# Patient Record
Sex: FEMALE | Race: ASIAN
[De-identification: names, ages, dates, MRNs, and addresses within clinical notes are randomized per-mention and may not be internally consistent; named-entity substitution may affect disease eponyms.]

---

## 2017-06-22 ENCOUNTER — HOSPITAL ENCOUNTER (OUTPATIENT)
Dept: HOSPITAL 10 - LAB | Age: 37
Discharge: HOME | End: 2017-06-22
Attending: OBSTETRICS & GYNECOLOGY
Payer: COMMERCIAL

## 2017-06-22 DIAGNOSIS — N83.9: Primary | ICD-10-CM

## 2017-06-22 PROCEDURE — 86305 HUMAN EPIDIDYMIS PROTEIN 4: CPT

## 2017-06-22 PROCEDURE — 86304 IMMUNOASSAY TUMOR CA 125: CPT

## 2017-07-18 ENCOUNTER — HOSPITAL ENCOUNTER (OUTPATIENT)
Dept: HOSPITAL 10 - LAB | Age: 37
Discharge: HOME | End: 2017-07-18
Attending: INTERNAL MEDICINE
Payer: COMMERCIAL

## 2017-07-18 DIAGNOSIS — L04.0: Primary | ICD-10-CM

## 2017-07-18 LAB
ADD SCAN DIFF: NO
ALBUMIN SERPL-MCNC: 3.8 G/DL (ref 3.3–4.9)
ALBUMIN/GLOB SERPL: 0.97 {RATIO}
ALP SERPL-CCNC: 58 IU/L (ref 42–121)
ALT SERPL-CCNC: 40 IU/L (ref 13–69)
ANION GAP SERPL CALC-SCNC: 15 MMOL/L (ref 8–16)
AST SERPL-CCNC: 36 IU/L (ref 15–46)
BASOPHILS # BLD AUTO: 0.1 10^3/UL (ref 0–0.1)
BASOPHILS NFR BLD: 1.2 % (ref 0–2)
BILIRUB DIRECT SERPL-MCNC: 0 MG/DL (ref 0–0.2)
BILIRUB SERPL-MCNC: 0 MG/DL (ref 0.2–1.3)
BUN SERPL-MCNC: 11 MG/DL (ref 7–20)
CALCIUM SERPL-MCNC: 8.9 MG/DL (ref 8.4–10.2)
CHLORIDE SERPL-SCNC: 101 MMOL/L (ref 97–110)
CO2 SERPL-SCNC: 30 MMOL/L (ref 21–31)
CREAT SERPL-MCNC: 0.67 MG/DL (ref 0.44–1)
CRP SERPL-MCNC: < 0.5 MG/DL (ref 0–0.9)
EOSINOPHIL # BLD: 0.1 10^3/UL (ref 0–0.5)
EOSINOPHIL NFR BLD: 2.7 % (ref 0–7)
ERYTHROCYTE [DISTWIDTH] IN BLOOD BY AUTOMATED COUNT: 15.5 % (ref 11.5–14.5)
GLOBULIN SER-MCNC: 3.9 G/DL (ref 1.3–3.2)
GLUCOSE SERPL-MCNC: 94 MG/DL (ref 70–220)
HCT VFR BLD CALC: 37.1 % (ref 37–47)
HGB BLD-MCNC: 12.2 G/DL (ref 12–16)
LYMPHOCYTES # BLD AUTO: 1.1 10^3/UL (ref 0.8–2.9)
LYMPHOCYTES NFR BLD AUTO: 21 % (ref 15–51)
MCH RBC QN AUTO: 26.9 PG (ref 29–33)
MCHC RBC AUTO-ENTMCNC: 32.9 G/DL (ref 32–37)
MCV RBC AUTO: 81.9 FL (ref 82–101)
MONOCYTES # BLD: 0.5 10^3/UL (ref 0.3–0.9)
MONOCYTES NFR BLD: 9.1 % (ref 0–11)
NEUTROPHILS # BLD: 3.4 10^3/UL (ref 1.6–7.5)
NEUTROPHILS NFR BLD AUTO: 65.8 % (ref 39–77)
NRBC # BLD MANUAL: 0 10^3/UL (ref 0–0)
NRBC BLD QL: 0 /100WBC (ref 0–0)
PLATELET # BLD: 280 10^3/UL (ref 140–415)
PMV BLD AUTO: 9.6 FL (ref 7.4–10.4)
POTASSIUM SERPL-SCNC: 4.1 MMOL/L (ref 3.5–5.1)
PROT SERPL-MCNC: 7.7 G/DL (ref 6.1–8.1)
RBC # BLD AUTO: 4.53 10^6/UL (ref 4.2–5.4)
SODIUM SERPL-SCNC: 142 MMOL/L (ref 135–144)
WBC # BLD AUTO: 5.2 10^3/UL (ref 4.8–10.8)

## 2017-07-18 PROCEDURE — 85651 RBC SED RATE NONAUTOMATED: CPT

## 2017-07-18 PROCEDURE — 86140 C-REACTIVE PROTEIN: CPT

## 2017-07-18 PROCEDURE — 85025 COMPLETE CBC W/AUTO DIFF WBC: CPT

## 2017-07-18 PROCEDURE — 86038 ANTINUCLEAR ANTIBODIES: CPT

## 2017-07-18 PROCEDURE — 80053 COMPREHEN METABOLIC PANEL: CPT

## 2017-07-27 ENCOUNTER — HOSPITAL ENCOUNTER (OUTPATIENT)
Dept: HOSPITAL 10 - C/S | Age: 37
Discharge: HOME | End: 2017-07-27
Attending: INTERNAL MEDICINE
Payer: COMMERCIAL

## 2017-07-27 ENCOUNTER — HOSPITAL ENCOUNTER (OUTPATIENT)
Dept: HOSPITAL 10 - C/S | Age: 37
Discharge: HOME | End: 2017-07-27
Attending: OBSTETRICS & GYNECOLOGY
Payer: COMMERCIAL

## 2017-07-27 DIAGNOSIS — N94.89: Primary | ICD-10-CM

## 2017-07-27 DIAGNOSIS — R59.0: Primary | ICD-10-CM

## 2017-07-27 PROCEDURE — 70491 CT SOFT TISSUE NECK W/DYE: CPT

## 2017-07-27 PROCEDURE — 72192 CT PELVIS W/O DYE: CPT

## 2017-07-27 PROCEDURE — 86308 HETEROPHILE ANTIBODY SCREEN: CPT

## 2017-07-27 PROCEDURE — 84703 CHORIONIC GONADOTROPIN ASSAY: CPT

## 2017-07-27 PROCEDURE — 86644 CMV ANTIBODY: CPT

## 2017-07-27 PROCEDURE — 86664 EPSTEIN-BARR NUCLEAR ANTIGEN: CPT

## 2017-07-28 LAB
CMV IGG SERPL IA-ACNC: 1.7 U/ML
CMV IGM SERPL IA-ACNC: <30 AU/ML
EBV ANTIBODY - INTERPRETATION: (no result)

## 2017-07-28 NOTE — RADRPT
PROCEDURE:   CT Pelvis without contrast. 

 

CLINICAL INDICATION:   Right pelvic mass. 

 

TECHNIQUE:   CT scan of the pelvis without intravenous contrast was performed. Helical axial section
s were obtained through the pelvis without intravenous contrast enhancement.  Coronal and sagittal r
eformatted images were obtained from the axial source images. Images were reviewed on a high-resolut
ion PACS workstation. Total exam DLP is 142.07 mGy-cm.  CTDIvol is 4.69 mGy.  One or more of the fol
lowing dose reduction techniques were used: Automated exposure control, adjustment of the mA and/or 
kV according to patient size, use of iterative reconstruction technique.

 

COMPARISON:   None. 

 

FINDINGS:

There is a predominately fat-containing mass in the right side of the pelvis measuring 3.6 x 2.5 x 3
.9 cm in AP, transverse, and cranial caudal dimensions.  The mass has a more solid component inferio
rly.  The findings are consistent with a benign ovarian teratoma. There is no other pelvic mass or l
ymphadenopathy.

Contrast is present in the ureters and bladder from a prior intravenous contrast enhanced CT scan of
 the soft tissues of the neck. 

The periappendiceal region is unremarkable with no evidence of appendicitis. 

The bowel and mesentery are normal. 

There is no free fluid or free gas. 

The osseous structures are unremarkable with no fracture or lytic lesion.  

 

IMPRESSION:

1.  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

2.  Otherwise unremarkable study.  

 

RPTAT: QQ

_____________________________________________ 

.Jp Mathias MD, MD           Date    Time 

Electronically viewed and signed by .Jp Mathias MD, MD on 07/28/2017 14:01 

 

D:  07/28/2017 14:01  T:  07/28/2017 14:01

.R/

## 2017-07-28 NOTE — RADRPT
PROCEDURE: CT Neck with contrast

 

CLINICAL INDICATION: Cervical lymphadenopathy 

 

TECHNIQUE:   CT of the neck was performed following the intravenous administration of 80 cc Omnipaqu
e-300 IV Contrast. Axial images were obtained through the neck with multiplanar reformatted images g
enerated from the axial acquired data. The administered radiation dose was CTDI vol =  8.33 mGy, DLP
 = 222.35 mGy-cm. One or more of the following dose reduction techniques were used: Automated exposu
re control, Adjustment of the mA and/or kV according to patient size, or Use of iterative reconstruc
tion technique.

 

COMPARISON: There are no similar studies submitted for comparison.

 

FINDINGS:

 

SKULL: The visualized portions of the brain are grossly unremarkable.The visualized orbits are unrem
arkable.The visualized paranasal sinuses are well aerated.The bilateral mastoid air cells are within
 normal limits. 

 

PAROTID GLANDS: The bilateral parotid glands are prominent and contain multiple microcysts and sever
al punctate calcifications/sialoliths.  Along the proximal left parotid duct, there is an ovoid low 
attenuation soft tissue measuring 11 mm AP x 6 mm TR x 14 mm CC. While this may represent accessory 
parotid tissue, it appears and measures lower in attenuation than the adjacent main left parotid gla
nd.

 

SUBMANDIBULAR GLANDS: Unremarkable. 

 

THYROID GLAND: Unremarkable. 

 

VASCULATURE: The bilateral vascular structures are patent.

 

LYMPH NODES: Multiple small lymph nodes are identified in the neck in levels I-V which are not patho
logically enlarged or necrotic. The lymph nodes are relatively bilateral and symmetric in distributi
on.

 

AERODIGESTIVE TRACT: There is streak artifact from dental hardware limiting evaluation of the oral c
avity.No primary aerodigestive tract lesion is identified.

 

THORAX: The partially imaged upper lung reveal multiple bilateral nodular and consolidative opacitie
s in a peribronchovascular distribution, suspicious for infectious/inflammatory etiology.  

 

OSSEOUS STRUCTURES: No destructive lytic or blastic osseous lesion is identified.

 

IMPRESSION:

 

1.  Bilateral prominent parotid gland containing microcysts and several punctate intraparotid calcif
ications/sialoliths bilaterally. No stones are seen within the parotid ducts. No significant adjacen
t inflammation to suggest sialoadenitis. The findings c are nonspecific, but can be seen sjogren's d
isease.

 

2. Ovoid soft tissue measuring up to 14 mm adjacent to the left retromandibular vein and proximal le
ft parotid duct. While this may represent accessory parotid tissue, it measures lower attenuation th
an the adjacent main left parotid gland. Recommend MRI neck with contrast for further evaluation.

 

3. The partially imaged upper lung reveal multiple bilateral nodular and consolidative opacities in 
a peribronchovascular distribution, suspicious for infectious/inflammatory etiology. Consider CT uriel
st for further evaluation, as clinically indicated.

 

4.  No cervical adenopathy.

 

RPTAT: AA

_____________________________________________ 

Geri Ma Physician           Date    Time 

Electronically viewed and signed by Geri Ma Physician on 07/28/2017 14:57 

 

D:  07/28/2017 14:57  T:  07/28/2017 14:57

KARLOS/

## 2017-08-03 ENCOUNTER — HOSPITAL ENCOUNTER (OUTPATIENT)
Dept: HOSPITAL 10 - C/S | Age: 37
Discharge: HOME | End: 2017-08-03
Attending: INTERNAL MEDICINE
Payer: COMMERCIAL

## 2017-08-03 DIAGNOSIS — R93.8: Primary | ICD-10-CM

## 2017-08-03 DIAGNOSIS — R91.8: ICD-10-CM

## 2017-08-03 PROCEDURE — 71260 CT THORAX DX C+: CPT

## 2017-08-03 NOTE — RADRPT
PROCEDURE:   CT Chest with contrast. 

 

CLINICAL INDICATION:   Follow-up abnormal CT scan of the soft tissues of the neck.  Upper lung bilat
eral nodular opacities.. 

 

TECHNIQUE:   Helical axial sections were obtained through the chest with intravenous contrast enhanc
ement. 75  ml of Omnipaque-300  was used for the intravenous contrast.  Coronal and sagittal reforma
tted images were obtained from the axial source images. Total exam DLP is 122.25 mGy-cm.  CTDIvol is
 3.47 mGy.  One or more of the following dose reduction techniques were used: Automated exposure con
trol, adjustment of the mA and/or kV according to patient size, use of iterative reconstruction tech
nique.

 

COMPARISON:    Chest x-ray dated 10/18/2016.  CT scan of the soft tissues of the neck dated 07/27/20
17.    

 

FINDINGS:

As seen on the prior CT scan, there are bilateral nodular and consolidation opacities in the apices 
consistent with an inflammatory or infectious process.  The lungs are otherwise clear with no other 
airspace or interstitial disease. 

There is no pulmonary nodule or mass lesion. 

There is no mediastinal or hilar lymphadenopathy or mass. 

There is no axillary, supraclavicular, or internal mammary lymphadenopathy. 

The thoracic aorta is not dilated. 

The heart size is normal. 

There is no pleural effusion or pericardial effusion. 

Images through the upper abdomen demonstrate normal visualized portions of the liver, spleen, and ad
renals.  

 

IMPRESSION:

1.  Bilateral nodular and consolidation opacities in the lung apices consistent with an inflammatory
 or infectious process.  Clinical correlation is advised.  This was also present on 10/18/2016 chest
 x-ray.

2.  Otherwise normal CT scan of the chest.  

 

RPTAT: QQ

_____________________________________________ 

.Jp Mathias MD, MD           Date    Time 

Electronically viewed and signed by .Jp Mathias MD, MD on 08/03/2017 17:20 

 

D:  08/03/2017 17:20  T:  08/03/2017 17:20

.R/

## 2017-08-11 ENCOUNTER — HOSPITAL ENCOUNTER (INPATIENT)
Dept: HOSPITAL 10 - REC | Age: 37
LOS: 2 days | Discharge: HOME | DRG: 743 | End: 2017-08-13
Attending: OBSTETRICS & GYNECOLOGY | Admitting: OBSTETRICS & GYNECOLOGY
Payer: COMMERCIAL

## 2017-08-11 VITALS — SYSTOLIC BLOOD PRESSURE: 120 MMHG | DIASTOLIC BLOOD PRESSURE: 80 MMHG | RESPIRATION RATE: 22 BRPM | HEART RATE: 86 BPM

## 2017-08-11 VITALS — RESPIRATION RATE: 16 BRPM | HEART RATE: 74 BPM | SYSTOLIC BLOOD PRESSURE: 107 MMHG | DIASTOLIC BLOOD PRESSURE: 66 MMHG

## 2017-08-11 VITALS — HEART RATE: 76 BPM | DIASTOLIC BLOOD PRESSURE: 78 MMHG | RESPIRATION RATE: 15 BRPM | SYSTOLIC BLOOD PRESSURE: 108 MMHG

## 2017-08-11 VITALS — DIASTOLIC BLOOD PRESSURE: 64 MMHG | RESPIRATION RATE: 14 BRPM | SYSTOLIC BLOOD PRESSURE: 109 MMHG | HEART RATE: 76 BPM

## 2017-08-11 VITALS — DIASTOLIC BLOOD PRESSURE: 85 MMHG | SYSTOLIC BLOOD PRESSURE: 131 MMHG | HEART RATE: 80 BPM | RESPIRATION RATE: 20 BRPM

## 2017-08-11 VITALS — DIASTOLIC BLOOD PRESSURE: 72 MMHG | RESPIRATION RATE: 20 BRPM | SYSTOLIC BLOOD PRESSURE: 127 MMHG | HEART RATE: 78 BPM

## 2017-08-11 VITALS — SYSTOLIC BLOOD PRESSURE: 110 MMHG | DIASTOLIC BLOOD PRESSURE: 70 MMHG | HEART RATE: 74 BPM | RESPIRATION RATE: 17 BRPM

## 2017-08-11 VITALS — SYSTOLIC BLOOD PRESSURE: 106 MMHG | DIASTOLIC BLOOD PRESSURE: 63 MMHG | HEART RATE: 75 BPM | RESPIRATION RATE: 16 BRPM

## 2017-08-11 VITALS — DIASTOLIC BLOOD PRESSURE: 68 MMHG | SYSTOLIC BLOOD PRESSURE: 103 MMHG | RESPIRATION RATE: 18 BRPM | HEART RATE: 98 BPM

## 2017-08-11 VITALS
BODY MASS INDEX: 20.44 KG/M2 | WEIGHT: 101.41 LBS | BODY MASS INDEX: 20.44 KG/M2 | WEIGHT: 101.41 LBS | HEIGHT: 59 IN | HEIGHT: 59 IN

## 2017-08-11 VITALS — HEART RATE: 85 BPM | SYSTOLIC BLOOD PRESSURE: 106 MMHG | DIASTOLIC BLOOD PRESSURE: 61 MMHG | RESPIRATION RATE: 16 BRPM

## 2017-08-11 VITALS — SYSTOLIC BLOOD PRESSURE: 119 MMHG | HEART RATE: 75 BPM | RESPIRATION RATE: 24 BRPM | DIASTOLIC BLOOD PRESSURE: 75 MMHG

## 2017-08-11 VITALS — SYSTOLIC BLOOD PRESSURE: 102 MMHG | DIASTOLIC BLOOD PRESSURE: 57 MMHG | RESPIRATION RATE: 19 BRPM

## 2017-08-11 VITALS — RESPIRATION RATE: 19 BRPM | DIASTOLIC BLOOD PRESSURE: 69 MMHG | SYSTOLIC BLOOD PRESSURE: 112 MMHG

## 2017-08-11 VITALS — RESPIRATION RATE: 19 BRPM | HEART RATE: 74 BPM | DIASTOLIC BLOOD PRESSURE: 65 MMHG | SYSTOLIC BLOOD PRESSURE: 111 MMHG

## 2017-08-11 VITALS — HEART RATE: 72 BPM | RESPIRATION RATE: 14 BRPM | SYSTOLIC BLOOD PRESSURE: 113 MMHG | DIASTOLIC BLOOD PRESSURE: 75 MMHG

## 2017-08-11 VITALS — SYSTOLIC BLOOD PRESSURE: 107 MMHG | DIASTOLIC BLOOD PRESSURE: 66 MMHG | RESPIRATION RATE: 16 BRPM | HEART RATE: 70 BPM

## 2017-08-11 VITALS — DIASTOLIC BLOOD PRESSURE: 75 MMHG | RESPIRATION RATE: 17 BRPM | SYSTOLIC BLOOD PRESSURE: 115 MMHG | HEART RATE: 74 BPM

## 2017-08-11 VITALS — HEART RATE: 96 BPM | RESPIRATION RATE: 16 BRPM | DIASTOLIC BLOOD PRESSURE: 60 MMHG | SYSTOLIC BLOOD PRESSURE: 98 MMHG

## 2017-08-11 VITALS — SYSTOLIC BLOOD PRESSURE: 106 MMHG | RESPIRATION RATE: 14 BRPM | DIASTOLIC BLOOD PRESSURE: 62 MMHG | HEART RATE: 86 BPM

## 2017-08-11 DIAGNOSIS — Z83.3: ICD-10-CM

## 2017-08-11 DIAGNOSIS — Z84.89: ICD-10-CM

## 2017-08-11 DIAGNOSIS — D27.0: Primary | ICD-10-CM

## 2017-08-11 DIAGNOSIS — Z80.9: ICD-10-CM

## 2017-08-11 LAB
APTT BLD: 29.3 SEC (ref 25–35)
BASOPHILS # BLD AUTO: 0.1 10^3/UL (ref 0–0.1)
BASOPHILS NFR BLD: 1.1 % (ref 0–2)
EOSINOPHIL # BLD: 0.2 10^3/UL (ref 0–0.5)
EOSINOPHIL NFR BLD: 3.9 % (ref 0–7)
ERYTHROCYTE [DISTWIDTH] IN BLOOD BY AUTOMATED COUNT: 15.5 % (ref 11.5–14.5)
HCT VFR BLD CALC: 33.7 % (ref 37–47)
HGB BLD-MCNC: 11.2 G/DL (ref 12–16)
INR PPP: 0.92
LYMPHOCYTES # BLD AUTO: 1.5 10^3/UL (ref 0.8–2.9)
LYMPHOCYTES NFR BLD AUTO: 34.7 % (ref 15–51)
MCH RBC QN AUTO: 26.7 PG (ref 29–33)
MCHC RBC AUTO-ENTMCNC: 33.2 G/DL (ref 32–37)
MCV RBC AUTO: 80.2 FL (ref 82–101)
MONOCYTES # BLD: 0.4 10^3/UL (ref 0.3–0.9)
MONOCYTES NFR BLD: 9 % (ref 0–11)
NEUTROPHILS # BLD: 2.2 10^3/UL (ref 1.6–7.5)
NEUTROPHILS NFR BLD AUTO: 51.1 % (ref 39–77)
NRBC # BLD MANUAL: 0 10^3/UL (ref 0–0)
NRBC BLD AUTO-RTO: 0 /100WBC (ref 0–0)
PLATELET # BLD: 269 10^3/UL (ref 140–415)
PMV BLD AUTO: 9.3 FL (ref 7.4–10.4)
PROTHROMBIN TIME: 12.4 SEC (ref 12.2–14.2)
PT RATIO: 1
RBC # BLD AUTO: 4.2 10^6/UL (ref 4.2–5.4)
WBC # BLD AUTO: 4.4 10^3/UL (ref 4.8–10.8)

## 2017-08-11 PROCEDURE — 85025 COMPLETE CBC W/AUTO DIFF WBC: CPT

## 2017-08-11 PROCEDURE — 84703 CHORIONIC GONADOTROPIN ASSAY: CPT

## 2017-08-11 PROCEDURE — 85730 THROMBOPLASTIN TIME PARTIAL: CPT

## 2017-08-11 PROCEDURE — 0UB00ZZ EXCISION OF RIGHT OVARY, OPEN APPROACH: ICD-10-PCS | Performed by: OBSTETRICS & GYNECOLOGY

## 2017-08-11 PROCEDURE — 85610 PROTHROMBIN TIME: CPT

## 2017-08-11 PROCEDURE — 87086 URINE CULTURE/COLONY COUNT: CPT

## 2017-08-11 RX ADMIN — PYRIDOXINE HYDROCHLORIDE SCH MLS/HR: 100 INJECTION, SOLUTION INTRAMUSCULAR; INTRAVENOUS at 23:03

## 2017-08-11 RX ADMIN — DEXAMETHASONE SODIUM PHOSPHATE PRN MG: 10 INJECTION, SOLUTION INTRAMUSCULAR; INTRAVENOUS at 22:59

## 2017-08-11 NOTE — OPR
Date/Time of Note


Date/Time of Note


DATE: 8/11/17 


TIME: 17:12





Operative Report


Procedure Date:  Aug 11, 2017


Preoperative Diagnosis


Right 4 cm ovarian dermoid cyst


Postoperative Diagnosis


Same.


Operation Performed


Minilaparotomy with right ovarian cystectomy.


Surgeon:  LUNA EAGLE MD


Anesthesia Type:  general


Anesthesiologist:  BABITA REED MD


Estimated Blood Loss:  0 - 10 ml's


Transfusion Required:  no


Specimens


Right ovarian dermoid cyst.


Complications:  no


Pt Condition Post Procedure:  stable


Disposition:  PACU


Procedure Description


Pt was brought to the OR and placed under general anesthesia. A stewart catheter 

was placed and she was prepped and draped in the usual sterile fashion. A small 

Pfannensteil incision was made using a knife through the skin and subcutaneous 

tissue. The fascia was incised and opened further with scissors. Kocher clamps 

were used to grasp the upper edge of the fascia and the rectus muscles were 

 from the fascia using blunt dissection. The rectus muscles were 

 at midline with a lenny clamp and the anterior peritoneum was entered 

using that and fingers. The incision was stretched open using hands. The right 

ovary was located and elevated through the incision with a 4 cm cyst apparent. 

The left one was located and appeared normal. Wet laps were placed around the 

right ovary and a knife was used to incise the surface. Blunt dissection and 

scissors were used to peel out the cyst from the ovary. There was a little bit 

of leakage of fatty fluid and hairs could be seen peaking out of the leak site. 

When the cyst was removed, the ovary was then stitched back together using 2-0 

chromic in a purse string stitch for the inside and a baseball stitch on the 

outside with excellent hemostasis at the end. A piece of Interceed was sewn to 

the surface. The pelvis was irrigated lightly and the ovary was replaced back 

into the abdomen. The anterior peritoneum was closed with 2-0 vicryl and the 

muscles brought back together at midline. The underbelly of the fascia was 

examined and noted to be dry. The fascia was closed with 0 Chromic suture.The 

subcutaneous tissue was irrigated and closed with 2-0 chromic. The skin wqas 

closed with 4-0 monocryl. Steri-strips were placed at the end with Benzoin. The 

procedure was terminated with the pt having tolerated it well and she was 

awakened from general anesthesia and transferred to the recovery room.











LUNA EAGLE MD Aug 11, 2017 17:29

## 2017-08-12 VITALS — SYSTOLIC BLOOD PRESSURE: 119 MMHG | RESPIRATION RATE: 15 BRPM | DIASTOLIC BLOOD PRESSURE: 76 MMHG

## 2017-08-12 VITALS — DIASTOLIC BLOOD PRESSURE: 57 MMHG | SYSTOLIC BLOOD PRESSURE: 110 MMHG | RESPIRATION RATE: 18 BRPM

## 2017-08-12 VITALS — RESPIRATION RATE: 18 BRPM | SYSTOLIC BLOOD PRESSURE: 106 MMHG | DIASTOLIC BLOOD PRESSURE: 61 MMHG

## 2017-08-12 VITALS — DIASTOLIC BLOOD PRESSURE: 63 MMHG | SYSTOLIC BLOOD PRESSURE: 101 MMHG | RESPIRATION RATE: 16 BRPM

## 2017-08-12 RX ADMIN — HYDROMORPHONE HYDROCHLORIDE PRN MG: 1 INJECTION, SOLUTION INTRAMUSCULAR; INTRAVENOUS; SUBCUTANEOUS at 17:27

## 2017-08-12 RX ADMIN — DEXAMETHASONE SODIUM PHOSPHATE PRN MG: 10 INJECTION, SOLUTION INTRAMUSCULAR; INTRAVENOUS at 11:37

## 2017-08-12 RX ADMIN — PYRIDOXINE HYDROCHLORIDE SCH MLS/HR: 100 INJECTION, SOLUTION INTRAMUSCULAR; INTRAVENOUS at 07:53

## 2017-08-12 RX ADMIN — PYRIDOXINE HYDROCHLORIDE SCH MLS/HR: 100 INJECTION, SOLUTION INTRAMUSCULAR; INTRAVENOUS at 18:09

## 2017-08-12 RX ADMIN — HYDROMORPHONE HYDROCHLORIDE PRN MG: 1 INJECTION, SOLUTION INTRAMUSCULAR; INTRAVENOUS; SUBCUTANEOUS at 21:47

## 2017-08-12 RX ADMIN — DEXAMETHASONE SODIUM PHOSPHATE PRN MG: 10 INJECTION, SOLUTION INTRAMUSCULAR; INTRAVENOUS at 18:09

## 2017-08-12 RX ADMIN — PYRIDOXINE HYDROCHLORIDE SCH MLS/HR: 100 INJECTION, SOLUTION INTRAMUSCULAR; INTRAVENOUS at 07:50

## 2017-08-12 RX ADMIN — HYDROMORPHONE HYDROCHLORIDE PRN MG: 1 INJECTION, SOLUTION INTRAMUSCULAR; INTRAVENOUS; SUBCUTANEOUS at 13:05

## 2017-08-12 RX ADMIN — IBUPROFEN SCH MG: 800 TABLET ORAL at 19:40

## 2017-08-12 RX ADMIN — HYDROMORPHONE HYDROCHLORIDE PRN MG: 1 INJECTION, SOLUTION INTRAMUSCULAR; INTRAVENOUS; SUBCUTANEOUS at 00:37

## 2017-08-12 RX ADMIN — HYDROMORPHONE HYDROCHLORIDE PRN MG: 1 INJECTION, SOLUTION INTRAMUSCULAR; INTRAVENOUS; SUBCUTANEOUS at 07:49

## 2017-08-12 NOTE — QN
Documentation


Comment


Progress note POD #1


Pt feeling nauseous this AM. Had received 2 mg of Dilaudid which was too much 

for her and her BP dropped a little. 1 mg gave her adequate pain relief but 

still has a bit of nausea.


No flatus.


T=98.7  /76


Abdomen soft, NT. Dressing is clean, dry and intact.


Ext NT with A-pumps.


A: POD #1. Stable.


P: Continue care. Tried 0.5 Dilaudid and pt is not getting adequate relief. 

Does not want Toradol again as felt her chest tighten afterwards. Now tells me 

that she took Motrin 800mg only after her c-sections.Will initiate that. Brady 

must be d/c'ed in the AM.











LUNA EAGLE MD Aug 12, 2017 19:32

## 2017-08-13 VITALS — RESPIRATION RATE: 20 BRPM | SYSTOLIC BLOOD PRESSURE: 115 MMHG | DIASTOLIC BLOOD PRESSURE: 75 MMHG

## 2017-08-13 VITALS — SYSTOLIC BLOOD PRESSURE: 116 MMHG | DIASTOLIC BLOOD PRESSURE: 61 MMHG | RESPIRATION RATE: 18 BRPM

## 2017-08-13 RX ADMIN — DEXAMETHASONE SODIUM PHOSPHATE PRN MG: 10 INJECTION, SOLUTION INTRAMUSCULAR; INTRAVENOUS at 09:20

## 2017-08-13 RX ADMIN — HYDROMORPHONE HYDROCHLORIDE PRN MG: 1 INJECTION, SOLUTION INTRAMUSCULAR; INTRAVENOUS; SUBCUTANEOUS at 03:21

## 2017-08-13 RX ADMIN — PYRIDOXINE HYDROCHLORIDE SCH MLS/HR: 100 INJECTION, SOLUTION INTRAMUSCULAR; INTRAVENOUS at 03:25

## 2017-08-13 RX ADMIN — IBUPROFEN SCH MG: 800 TABLET ORAL at 13:16

## 2017-08-13 RX ADMIN — IBUPROFEN SCH MG: 800 TABLET ORAL at 08:58

## 2017-08-13 NOTE — PD.PPDC
OB/GYN Discharge Instruction


Condition


Patient Condition:  Good





Diet


Diet:  Resume Regular Diet





Activity/Restrictions








Activity:   Bedrest





 May be up to bathroom





 May be up for meals





 May Shower














Restrictions:   No Exercising





 No Lifting





 No Driving





 Minimize Walking





 Minimize Stair-climbing





 No Sexual Activity











Follow-up


Follow-up with Physician:  2, Week/Weeks





Return to clinic for








GYN Instructions:   Fever greater than 101





 Chills





 Worsening abdominal pain





 Excessive Vaginal Bleeding














Surgical Instructions:   Incisional Drainage





 Incisional Redness

















LUNA EAGLE MD Aug 13, 2017 09:58

## 2017-08-13 NOTE — DS
Date/Time of Note


Date/Time of Note


DATE: 17 


TIME: 10:00





Discharge Summary


Admission/Discharge Info


Admit Date/Time


Aug 11, 2017 at 12:17


Discharge Date/Time


2017


Discharge Diagnosis


Post-op exploratory laparotomy with right ovarian cystectomy for a dermoid 

tumor.


Patient Condition:  Good


Procedures


Exploratory laparotomy with right ovarian cystectomy


Hx of Present Illness


37 y.o.  with a right 4cm ovarian dermoid cyst here for removal of same.


Hospital Course


4 cm right ovarian dermoid cyst.


Home Meds


Discontinued Reported Medications


Prenatal Vits W-Ca,Fe,Fa(<1MG) (Prenatal) 1 Tab Tablet, 1 TAB PO DAILY


   12


Discontinued Scripts


Azithromycin* (Zithromax*) 250 Mg Tablet, 250 MG PO .ZPACK AS DIRECTED, #6 TAB


   TAKE 500 MG (2 TABS) THE FIRST DAY THEN 250 MG (1 TAB) DAYS 2-5


   Prov:JAMES HUI         10/18/16


Benzonatate* (Tessalon Perle*) 100 Mg Capsule, 100 MG PO Q8H Y for COUGH for 7 

Days, CAP


   Prov:JAMES HUI         10/18/16


Follow-up Plan


To office in 2 weeks for an incision check


Primary Care Provider


Karen Hernandez MD


Time spent on discharge:  < 30 minutes











LUNA EAGLE MD Aug 13, 2017 10:03

## 2017-09-07 ENCOUNTER — HOSPITAL ENCOUNTER (OUTPATIENT)
Dept: HOSPITAL 10 - LAB | Age: 37
Discharge: HOME | End: 2017-09-07
Attending: SPECIALIST
Payer: COMMERCIAL

## 2017-09-07 DIAGNOSIS — E04.1: Primary | ICD-10-CM

## 2017-09-07 LAB
ALBUMIN SERPL-MCNC: 4.4 G/DL (ref 3.3–4.9)
ALBUMIN/GLOB SERPL: 1.04 {RATIO}
ALP SERPL-CCNC: 71 IU/L (ref 42–121)
ALT SERPL-CCNC: 42 IU/L (ref 13–69)
ANION GAP SERPL CALC-SCNC: 12 MMOL/L (ref 8–16)
AST SERPL-CCNC: 36 IU/L (ref 15–46)
BASOPHILS # BLD AUTO: 0.1 10^3/UL (ref 0–0.1)
BASOPHILS NFR BLD: 1.1 % (ref 0–2)
BILIRUB DIRECT SERPL-MCNC: 0 MG/DL (ref 0–0.2)
BILIRUB SERPL-MCNC: 0.1 MG/DL (ref 0.2–1.3)
BUN SERPL-MCNC: 11 MG/DL (ref 7–20)
CALCIUM SERPL-MCNC: 9.7 MG/DL (ref 8.4–10.2)
CHLORIDE SERPL-SCNC: 103 MMOL/L (ref 97–110)
CO2 SERPL-SCNC: 28 MMOL/L (ref 21–31)
CREAT SERPL-MCNC: 0.71 MG/DL (ref 0.44–1)
EOSINOPHIL # BLD: 0.4 10^3/UL (ref 0–0.5)
EOSINOPHIL NFR BLD: 7.8 % (ref 0–7)
ERYTHROCYTE [DISTWIDTH] IN BLOOD BY AUTOMATED COUNT: 15.2 % (ref 11.5–14.5)
GLOBULIN SER-MCNC: 4.2 G/DL (ref 1.3–3.2)
GLUCOSE SERPL-MCNC: 91 MG/DL (ref 70–220)
HCT VFR BLD CALC: 37.9 % (ref 37–47)
HGB BLD-MCNC: 12.2 G/DL (ref 12–16)
IGA SERPL-MCNC: 575 MG/DL (ref 70–400)
IGG SERPL-MCNC: 1988 MG/DL (ref 700–1600)
IGM SERPL-MCNC: 74 MG/DL (ref 40–230)
LYMPHOCYTES # BLD AUTO: 1.5 10^3/UL (ref 0.8–2.9)
LYMPHOCYTES NFR BLD AUTO: 27.1 % (ref 15–51)
MAGNESIUM SERPL-MCNC: 2.1 MG/DL (ref 1.7–2.5)
MCH RBC QN AUTO: 26.2 PG (ref 29–33)
MCHC RBC AUTO-ENTMCNC: 32.2 G/DL (ref 32–37)
MCV RBC AUTO: 81.5 FL (ref 82–101)
MONOCYTES # BLD: 0.4 10^3/UL (ref 0.3–0.9)
MONOCYTES NFR BLD: 7.3 % (ref 0–11)
NEUTROPHILS NFR BLD AUTO: 56.5 % (ref 39–77)
NRBC # BLD MANUAL: 0 10^3/UL (ref 0–0)
NRBC BLD AUTO-RTO: 0 /100WBC (ref 0–0)
PHOSPHATE SERPL-MCNC: 3.1 MG/DL (ref 2.5–4.9)
PLATELET # BLD: 325 10^3/UL (ref 140–415)
PMV BLD AUTO: 9.4 FL (ref 7.4–10.4)
POTASSIUM SERPL-SCNC: 3.7 MMOL/L (ref 3.5–5.1)
PROT SERPL-MCNC: 8.6 G/DL (ref 6.1–8.1)
RBC # BLD AUTO: 4.65 10^6/UL (ref 4.2–5.4)
SODIUM SERPL-SCNC: 139 MMOL/L (ref 135–144)
TSH SERPL-ACNC: 0.75 MIU/L (ref 0.47–4.68)
WBC # BLD AUTO: 5.5 10^3/UL (ref 4.8–10.8)

## 2017-09-07 PROCEDURE — 82784 ASSAY IGA/IGD/IGG/IGM EACH: CPT

## 2017-09-07 PROCEDURE — 86641 CRYPTOCOCCUS ANTIBODY: CPT

## 2017-09-07 PROCEDURE — 86480 TB TEST CELL IMMUN MEASURE: CPT

## 2017-09-07 PROCEDURE — 84100 ASSAY OF PHOSPHORUS: CPT

## 2017-09-07 PROCEDURE — 85025 COMPLETE CBC W/AUTO DIFF WBC: CPT

## 2017-09-07 PROCEDURE — 84432 ASSAY OF THYROGLOBULIN: CPT

## 2017-09-07 PROCEDURE — 84155 ASSAY OF PROTEIN SERUM: CPT

## 2017-09-07 PROCEDURE — 84443 ASSAY THYROID STIM HORMONE: CPT

## 2017-09-07 PROCEDURE — 83735 ASSAY OF MAGNESIUM: CPT

## 2017-09-07 PROCEDURE — 84165 PROTEIN E-PHORESIS SERUM: CPT

## 2017-09-07 PROCEDURE — 86635 COCCIDIOIDES ANTIBODY: CPT

## 2017-09-07 PROCEDURE — 86606 ASPERGILLUS ANTIBODY: CPT

## 2017-09-07 PROCEDURE — 82785 ASSAY OF IGE: CPT

## 2017-09-07 PROCEDURE — 80053 COMPREHEN METABOLIC PANEL: CPT

## 2017-09-07 PROCEDURE — 86800 THYROGLOBULIN ANTIBODY: CPT

## 2017-09-20 ENCOUNTER — HOSPITAL ENCOUNTER (OUTPATIENT)
Dept: HOSPITAL 10 - LAB | Age: 37
Discharge: HOME | End: 2017-09-20
Attending: INTERNAL MEDICINE
Payer: COMMERCIAL

## 2017-09-20 DIAGNOSIS — R13.19: ICD-10-CM

## 2017-09-20 DIAGNOSIS — R59.0: Primary | ICD-10-CM

## 2017-09-20 LAB
APTT BLD: 29.3 SEC (ref 25–35)
INR PPP: 0.91
PROTHROMBIN TIME: 12.2 SEC (ref 12.2–14.2)
PT RATIO: 1

## 2017-09-20 PROCEDURE — 85610 PROTHROMBIN TIME: CPT

## 2017-09-20 PROCEDURE — 85730 THROMBOPLASTIN TIME PARTIAL: CPT

## 2017-09-29 ENCOUNTER — HOSPITAL ENCOUNTER (OUTPATIENT)
Dept: HOSPITAL 10 - SDS | Age: 37
Discharge: HOME | End: 2017-09-29
Attending: INTERNAL MEDICINE
Payer: COMMERCIAL

## 2017-09-29 VITALS — RESPIRATION RATE: 18 BRPM | DIASTOLIC BLOOD PRESSURE: 78 MMHG | HEART RATE: 66 BPM | SYSTOLIC BLOOD PRESSURE: 117 MMHG

## 2017-09-29 VITALS
HEIGHT: 59 IN | WEIGHT: 101.41 LBS | HEIGHT: 59 IN | WEIGHT: 101.41 LBS | BODY MASS INDEX: 20.44 KG/M2 | BODY MASS INDEX: 20.44 KG/M2

## 2017-09-29 VITALS — HEART RATE: 76 BPM | DIASTOLIC BLOOD PRESSURE: 84 MMHG | RESPIRATION RATE: 20 BRPM | SYSTOLIC BLOOD PRESSURE: 130 MMHG

## 2017-09-29 DIAGNOSIS — R59.0: Primary | ICD-10-CM

## 2017-09-29 DIAGNOSIS — Z53.9: ICD-10-CM

## 2017-09-29 PROCEDURE — 76536 US EXAM OF HEAD AND NECK: CPT

## 2017-09-29 NOTE — RADRPT
PROCEDURE:   Ultrasound of the soft tissues of the neck.

 

CLINICAL INDICATION:   Palpable lesion in the neck bilaterally. 

 

TECHNIQUE:   High-resolution sonography of the neck at the site of the bilateral palpable lesion was
 performed in the axial and sagittal planes.

 

COMPARISON:   None 

 

FINDINGS:

Small benign-appearing bilateral cervical lymph nodes are noted with the largest on the right measur
ing 1.3 x 0.3 cm and the largest on the left measuring 0.9 x 0.3 cm.

There is no abnormality at the site of the palpable lesion. There is no cystic or solid mass..

 

IMPRESSION:

1.  Benign-appearing bilateral cervical lymph nodes.

2.  Originally, this was scheduled as a biopsy. However, there is no pathology to biopsy.

3.  Any further management regarding the palpable lesion should be based on clinical grounds.

 

RPTAT: QQ

_____________________________________________ 

.Jp Mathias MD, MD           Date    Time 

Electronically viewed and signed by .Jp Mathias MD, MD on 09/29/2017 13:55 

 

D:  09/29/2017 13:55  T:  09/29/2017 13:55

.R/

## 2017-12-01 ENCOUNTER — HOSPITAL ENCOUNTER (OUTPATIENT)
Dept: HOSPITAL 10 - LAB | Age: 37
Discharge: HOME | End: 2017-12-01
Attending: INTERNAL MEDICINE
Payer: COMMERCIAL

## 2017-12-01 DIAGNOSIS — Z01.818: Primary | ICD-10-CM

## 2017-12-01 LAB
ALBUMIN SERPL-MCNC: 4 G/DL (ref 3.3–4.9)
ALBUMIN/GLOB SERPL: 1.08 {RATIO}
ALP SERPL-CCNC: 63 IU/L (ref 42–121)
ALT SERPL-CCNC: 37 IU/L (ref 13–69)
ANION GAP SERPL CALC-SCNC: 13 MMOL/L (ref 8–16)
AST SERPL-CCNC: 32 IU/L (ref 15–46)
BASOPHILS # BLD AUTO: 0.1 10^3/UL (ref 0–0.1)
BASOPHILS NFR BLD: 1.2 % (ref 0–2)
BILIRUB DIRECT SERPL-MCNC: 0 MG/DL (ref 0–0.2)
BILIRUB SERPL-MCNC: 0.1 MG/DL (ref 0.2–1.3)
BUN SERPL-MCNC: 10 MG/DL (ref 7–20)
CALCIUM SERPL-MCNC: 8.6 MG/DL (ref 8.4–10.2)
CHLORIDE SERPL-SCNC: 105 MMOL/L (ref 97–110)
CO2 SERPL-SCNC: 28 MMOL/L (ref 21–31)
CREAT SERPL-MCNC: 0.76 MG/DL (ref 0.44–1)
EOSINOPHIL # BLD: 0.3 10^3/UL (ref 0–0.5)
EOSINOPHIL NFR BLD: 5.7 % (ref 0–7)
ERYTHROCYTE [DISTWIDTH] IN BLOOD BY AUTOMATED COUNT: 15.6 % (ref 11.5–14.5)
GLOBULIN SER-MCNC: 3.7 G/DL (ref 1.3–3.2)
GLUCOSE SERPL-MCNC: 85 MG/DL (ref 70–220)
HCT VFR BLD CALC: 34.6 % (ref 37–47)
HGB BLD-MCNC: 10.9 G/DL (ref 12–16)
IGA SERPL-MCNC: 521 MG/DL (ref 70–400)
IGG SERPL-MCNC: 1795 MG/DL (ref 700–1600)
IGM SERPL-MCNC: 66 MG/DL (ref 40–230)
LYMPHOCYTES # BLD AUTO: 1.3 10^3/UL (ref 0.8–2.9)
LYMPHOCYTES NFR BLD AUTO: 22.8 % (ref 15–51)
MCH RBC QN AUTO: 24.4 PG (ref 29–33)
MCHC RBC AUTO-ENTMCNC: 31.5 G/DL (ref 32–37)
MCV RBC AUTO: 77.4 FL (ref 82–101)
MONOCYTES # BLD: 0.4 10^3/UL (ref 0.3–0.9)
MONOCYTES NFR BLD: 7 % (ref 0–11)
NEUTROPHILS # BLD: 3.6 10^3/UL (ref 1.6–7.5)
NEUTROPHILS NFR BLD AUTO: 63 % (ref 39–77)
NRBC # BLD MANUAL: 0 10^3/UL (ref 0–0)
NRBC BLD AUTO-RTO: 0 /100WBC (ref 0–0)
PLATELET # BLD: 340 10^3/UL (ref 140–415)
PMV BLD AUTO: 9.6 FL (ref 7.4–10.4)
POTASSIUM SERPL-SCNC: 3.6 MMOL/L (ref 3.5–5.1)
PROT SERPL-MCNC: 7.7 G/DL (ref 6.1–8.1)
RBC # BLD AUTO: 4.47 10^6/UL (ref 4.2–5.4)
SODIUM SERPL-SCNC: 142 MMOL/L (ref 135–144)
WBC # BLD AUTO: 5.7 10^3/UL (ref 4.8–10.8)

## 2017-12-01 PROCEDURE — 84155 ASSAY OF PROTEIN SERUM: CPT

## 2017-12-01 PROCEDURE — 84165 PROTEIN E-PHORESIS SERUM: CPT

## 2017-12-01 PROCEDURE — 86320 SERUM IMMUNOELECTROPHORESIS: CPT

## 2017-12-01 PROCEDURE — 85025 COMPLETE CBC W/AUTO DIFF WBC: CPT

## 2017-12-01 PROCEDURE — 82784 ASSAY IGA/IGD/IGG/IGM EACH: CPT

## 2017-12-01 PROCEDURE — 80053 COMPREHEN METABOLIC PANEL: CPT

## 2017-12-02 LAB
ALBUMIN SERPL-MCNC: 3.8 G/DL (ref 3.8–4.8)
PROE INTERPRETATION: (no result)
PROT SERPL-MCNC: 7.3 G/DL (ref 6.1–8.1)

## 2018-02-19 ENCOUNTER — HOSPITAL ENCOUNTER (OUTPATIENT)
Dept: HOSPITAL 91 - U/S | Age: 38
Discharge: HOME | End: 2018-02-19
Payer: COMMERCIAL

## 2018-02-19 ENCOUNTER — HOSPITAL ENCOUNTER (OUTPATIENT)
Age: 38
Discharge: HOME | End: 2018-02-19

## 2018-02-19 DIAGNOSIS — R22.1: ICD-10-CM

## 2018-02-19 DIAGNOSIS — E04.1: Primary | ICD-10-CM

## 2018-02-19 PROCEDURE — 76536 US EXAM OF HEAD AND NECK: CPT

## 2018-03-06 ENCOUNTER — HOSPITAL ENCOUNTER (OUTPATIENT)
Dept: HOSPITAL 91 - C/S | Age: 38
Discharge: HOME | End: 2018-03-06
Payer: COMMERCIAL

## 2018-03-06 ENCOUNTER — HOSPITAL ENCOUNTER (OUTPATIENT)
Age: 38
Discharge: HOME | End: 2018-03-06

## 2018-03-06 DIAGNOSIS — R91.1: Primary | ICD-10-CM

## 2018-03-06 PROCEDURE — 71250 CT THORAX DX C-: CPT

## 2018-03-07 ENCOUNTER — HOSPITAL ENCOUNTER (OUTPATIENT)
Dept: HOSPITAL 91 - NUC | Age: 38
Discharge: HOME | End: 2018-03-07
Payer: COMMERCIAL

## 2018-03-07 ENCOUNTER — HOSPITAL ENCOUNTER (OUTPATIENT)
Age: 38
Discharge: HOME | End: 2018-03-07

## 2018-03-07 DIAGNOSIS — R59.1: Primary | ICD-10-CM

## 2018-03-07 PROCEDURE — 78804 RP LOCLZJ TUM WHBDY 2+D IMG: CPT

## 2018-03-14 ENCOUNTER — HOSPITAL ENCOUNTER (OUTPATIENT)
Age: 38
Discharge: HOME | End: 2018-03-14

## 2019-04-05 ENCOUNTER — HOSPITAL ENCOUNTER (OUTPATIENT)
Dept: HOSPITAL 91 - U/S | Age: 39
Discharge: HOME | End: 2019-04-05
Payer: COMMERCIAL

## 2019-04-05 ENCOUNTER — HOSPITAL ENCOUNTER (OUTPATIENT)
Dept: HOSPITAL 10 - U/S | Age: 39
Discharge: HOME | End: 2019-04-05
Attending: INTERNAL MEDICINE
Payer: COMMERCIAL

## 2019-04-05 DIAGNOSIS — E04.1: ICD-10-CM

## 2019-04-05 DIAGNOSIS — C33: Primary | ICD-10-CM

## 2019-04-05 PROCEDURE — 76536 US EXAM OF HEAD AND NECK: CPT

## 2019-04-05 PROCEDURE — 71270 CT THORAX DX C-/C+: CPT

## 2019-04-05 RX ADMIN — IOHEXOL 1 ML: 300 INJECTION, SOLUTION INTRAVENOUS at 13:14

## 2019-04-05 RX ADMIN — SODIUM CHLORIDE 1 ML: 9 INJECTION, SOLUTION INTRAMUSCULAR; INTRAVENOUS; SUBCUTANEOUS at 13:14

## 2019-04-05 RX ADMIN — VASOPRESSIN 1 ML/10 SEC: 20 INJECTION, SOLUTION INTRAMUSCULAR; SUBCUTANEOUS at 13:14

## 2019-06-17 ENCOUNTER — HOSPITAL ENCOUNTER (OUTPATIENT)
Dept: HOSPITAL 10 - LAB | Age: 39
Discharge: HOME | End: 2019-06-17
Attending: INTERNAL MEDICINE
Payer: COMMERCIAL

## 2019-06-17 ENCOUNTER — HOSPITAL ENCOUNTER (OUTPATIENT)
Dept: HOSPITAL 91 - LAB | Age: 39
Discharge: HOME | End: 2019-06-17
Payer: COMMERCIAL

## 2019-06-17 DIAGNOSIS — Z11.1: Primary | ICD-10-CM

## 2019-06-17 PROCEDURE — 86480 TB TEST CELL IMMUN MEASURE: CPT

## 2019-06-19 LAB
MITOGEN-NIL: 8.54 IU/ML
NIL: 0.75 IU/ML
QUANTIFERON(R)-TB GOLD: POSITIVE
TB-NIL: 3.03 IU/ML
TB2-NIL: 2.6 IU/ML

## 2019-06-26 ENCOUNTER — HOSPITAL ENCOUNTER (OUTPATIENT)
Dept: HOSPITAL 10 - RAD | Age: 39
Discharge: HOME | End: 2019-06-26
Attending: INTERNAL MEDICINE
Payer: COMMERCIAL

## 2019-06-26 ENCOUNTER — HOSPITAL ENCOUNTER (OUTPATIENT)
Dept: HOSPITAL 91 - LAB | Age: 39
Discharge: HOME | End: 2019-06-26
Payer: COMMERCIAL

## 2019-06-26 ENCOUNTER — HOSPITAL ENCOUNTER (OUTPATIENT)
Dept: HOSPITAL 10 - LAB | Age: 39
Discharge: HOME | End: 2019-06-26
Attending: PODIATRIST
Payer: COMMERCIAL

## 2019-06-26 ENCOUNTER — HOSPITAL ENCOUNTER (OUTPATIENT)
Dept: HOSPITAL 91 - RAD | Age: 39
Discharge: HOME | End: 2019-06-26
Payer: COMMERCIAL

## 2019-06-26 DIAGNOSIS — Z00.00: Primary | ICD-10-CM

## 2019-06-26 DIAGNOSIS — R76.12: Primary | ICD-10-CM

## 2019-06-26 PROCEDURE — 71045 X-RAY EXAM CHEST 1 VIEW: CPT

## 2019-06-26 PROCEDURE — 86765 RUBEOLA ANTIBODY: CPT

## 2019-06-26 PROCEDURE — 86787 VARICELLA-ZOSTER ANTIBODY: CPT
